# Patient Record
Sex: MALE | Race: WHITE | NOT HISPANIC OR LATINO | Employment: OTHER | URBAN - METROPOLITAN AREA
[De-identification: names, ages, dates, MRNs, and addresses within clinical notes are randomized per-mention and may not be internally consistent; named-entity substitution may affect disease eponyms.]

---

## 2023-11-08 ENCOUNTER — TELEPHONE (OUTPATIENT)
Dept: FAMILY MEDICINE CLINIC | Facility: CLINIC | Age: 68
End: 2023-11-08

## 2023-11-08 NOTE — TELEPHONE ENCOUNTER
Caller: Tony Mike    Relationship: Self    Best call back number: [phone]    What form or medical record are you requesting: PT WILL BE COMING TOMORROW TO DROP FORM OFF THAT HE RECEIVED FROM PREVIOUS PROVIDER SO THAT NEW PCP CAN REQUEST MEDICAL RECORDS

## 2023-11-14 ENCOUNTER — LAB (OUTPATIENT)
Dept: FAMILY MEDICINE CLINIC | Facility: CLINIC | Age: 68
End: 2023-11-14
Payer: MEDICARE

## 2023-11-14 ENCOUNTER — OFFICE VISIT (OUTPATIENT)
Dept: FAMILY MEDICINE CLINIC | Facility: CLINIC | Age: 68
End: 2023-11-14
Payer: MEDICARE

## 2023-11-14 VITALS
HEART RATE: 86 BPM | RESPIRATION RATE: 16 BRPM | TEMPERATURE: 98.5 F | WEIGHT: 155.5 LBS | DIASTOLIC BLOOD PRESSURE: 66 MMHG | SYSTOLIC BLOOD PRESSURE: 110 MMHG | BODY MASS INDEX: 23.57 KG/M2 | HEIGHT: 68 IN | OXYGEN SATURATION: 98 %

## 2023-11-14 DIAGNOSIS — Z00.00 ENCOUNTER FOR MEDICAL EXAMINATION TO ESTABLISH CARE: Primary | ICD-10-CM

## 2023-11-14 DIAGNOSIS — M72.0 DUPUYTREN'S CONTRACTURE OF RIGHT HAND: ICD-10-CM

## 2023-11-14 DIAGNOSIS — M79.641 RIGHT HAND PAIN: ICD-10-CM

## 2023-11-14 DIAGNOSIS — E03.9 ACQUIRED HYPOTHYROIDISM: Primary | ICD-10-CM

## 2023-11-14 PROCEDURE — 84439 ASSAY OF FREE THYROXINE: CPT | Performed by: FAMILY MEDICINE

## 2023-11-14 PROCEDURE — 36415 COLL VENOUS BLD VENIPUNCTURE: CPT | Performed by: FAMILY MEDICINE

## 2023-11-14 PROCEDURE — 1159F MED LIST DOCD IN RCRD: CPT | Performed by: FAMILY MEDICINE

## 2023-11-14 PROCEDURE — 99203 OFFICE O/P NEW LOW 30 MIN: CPT | Performed by: FAMILY MEDICINE

## 2023-11-14 PROCEDURE — 1160F RVW MEDS BY RX/DR IN RCRD: CPT | Performed by: FAMILY MEDICINE

## 2023-11-14 PROCEDURE — 84443 ASSAY THYROID STIM HORMONE: CPT | Performed by: FAMILY MEDICINE

## 2023-11-14 RX ORDER — LEVOTHYROXINE SODIUM 0.1 MG/1
100 TABLET ORAL DAILY
COMMUNITY
End: 2023-11-17 | Stop reason: SDUPTHER

## 2023-11-14 RX ORDER — DIPHENOXYLATE HYDROCHLORIDE AND ATROPINE SULFATE 2.5; .025 MG/1; MG/1
1 TABLET ORAL DAILY
COMMUNITY

## 2023-11-14 NOTE — PROGRESS NOTES
Subjective   Tony Mike is a 68 y.o. male      Chief Complaint  Establish primary care.   Hypothyroidism.       History of Present Illness  The patient presents here today to establish primary care. He moved here from [place]. He is on Synthroid 100 mcg daily for hypothyroidism.    The patient has been experiencing pain in his right upper extremity in his hand for the past 2 years. He was referred to a hand specialist before he left [place]; however, he did not get there. The doctor initially thought it was carpal tunnel; however, when he looked at the things online, the symptoms did not line up. He came back to him and was told that it did not seem like carpal tunnel. He was told that he needed to see a hand specialist. He was initially diagnosed with Dupuytren's contracture; however, it has not progressed. He has been looking online for some possible therapies. It has not particularly bothered him; however, it is just the pains that are around it. He is not restricted motion wise. He has had pains in his hand; however, in the last week he started getting pains and they switched over to the side of his wrist. He has a lot of family arthritis and they had some diagnoses of arthritis in other joints. His wrist is a little restrictive. It is uncomfortable. He denies any tingling.    The patient was diagnosed with arthritis in his bilateral upper extremities 2 to 3 years ago. He went to his PCP and told him how he suddenly developed severe arthritis in his bilateral upper extremities. He had some serious pain. He was prescribed some medications. He was on gabapentin for 2 years. He weaned himself off of it. He does not have the pain; however, he has some discomfort in his shoulder sometimes. He went back to his PCP and they told him that it was not really severe. He has some occasional discomfort, especially when he is sleeping in his bed. He has to turn over to the other side sometimes.    The patient was diagnosed with  sciatica in 2019. He eventually got an injection in his back because the pain was radiating down his lower extremity. He has not had any symptoms of sciatica ever since.    The patient reports that his thyroid has been stable. He has glossal thyroid. The growth of his thyroid did not develop as it normally does in the throat. He has to take levothyroxine to supplement his thyroid. He has been on the same dosage for 20 years. He had a little bit of fluctuation in the dosage back in the late 1990s; however, it has been since the year of 2000. He needs a refill on his levothyroxine. He only has 9 tablets left. He needs a blood panel.    The patient has been doing his yearly physical irregularly. He is going to find an eye doctor and dentist. He just had his influenza vaccine and the latest COVID-19 vaccine 2 to 3 weeks ago. He has a little bit of a cold. He has not been back to get that. He has not had the shingles vaccine.      The following portions of the patient's history were reviewed and updated as appropriate: allergies, current medications, past social history and problem list.    Review of Systems   Constitutional:  Negative for fatigue and unexpected weight change.   Eyes:  Negative for visual disturbance.   Cardiovascular:  Negative for chest pain and palpitations.   Gastrointestinal:  Negative for constipation and diarrhea.   Endocrine: Negative for cold intolerance and heat intolerance.   Musculoskeletal:  Positive for arthralgias and myalgias.   Neurological:  Negative for tremors.   Psychiatric/Behavioral:  Negative for agitation. The patient is not nervous/anxious.          Objective         Vitals:    11/14/23 1303   BP: 110/66   Pulse: 86   Resp: 16   Temp: 98.5 °F (36.9 °C)   SpO2: 98%         Physical Exam  Vitals and nursing note reviewed.   Constitutional:       General: He is not in acute distress.     Appearance: Normal appearance. He is well-developed. He is not ill-appearing, toxic-appearing  or diaphoretic.   HENT:      Head: Normocephalic and atraumatic.   Eyes:      Comments: No exopthalmos noted   Neck:      Thyroid: No thyroid mass, thyromegaly or thyroid tenderness.   Cardiovascular:      Rate and Rhythm: Normal rate and regular rhythm.   Pulmonary:      Effort: Pulmonary effort is normal. No respiratory distress.   Lymphadenopathy:      Cervical: No cervical adenopathy.   Skin:     General: Skin is warm and dry.   Neurological:      Mental Status: He is alert and oriented to person, place, and time.      Coordination: Coordination normal.   Psychiatric:         Mood and Affect: Mood normal.         Behavior: Behavior normal.              No results were obtained or interpreted today.      Assessment & Plan     Problems Addressed this Visit    None  Visit Diagnoses       Acquired hypothyroidism    -  Primary    Relevant Medications    levothyroxine (SYNTHROID, LEVOTHROID) 100 MCG tablet    Other Relevant Orders    TSH    T4, Free    Dupuytren's contracture of right hand        Relevant Orders    Ambulatory Referral to Orthopedic Surgery    Right hand pain        Relevant Orders    Ambulatory Referral to Orthopedic Surgery          Diagnoses         Codes Comments    Acquired hypothyroidism    -  Primary ICD-10-CM: E03.9  ICD-9-CM: 244.9     Dupuytren's contracture of right hand     ICD-10-CM: M72.0  ICD-9-CM: 728.6     Right hand pain     ICD-10-CM: M79.641  ICD-9-CM: 729.5             I spent 35 minutes in patient care: Reviewing records prior to the visit, examining the patient, entering orders and documentation    Part of this note may be an electronic transcription/translation of spoken language to printed text using the Dragon Dictation System.          Transcribed from ambient dictation for NAEL Trujillo MD by Destiny Solorzano.  11/14/23   14:06 EST    Patient or patient representative verbalized consent to the visit recording.  I have personally performed the services described in  this document as transcribed by the above individual, and it is both accurate and complete.

## 2023-11-15 LAB
T4 FREE SERPL-MCNC: 1.28 NG/DL (ref 0.93–1.7)
TSH SERPL DL<=0.05 MIU/L-ACNC: 5.02 UIU/ML (ref 0.27–4.2)

## 2023-11-16 ENCOUNTER — OFFICE VISIT (OUTPATIENT)
Dept: ORTHOPEDIC SURGERY | Facility: CLINIC | Age: 68
End: 2023-11-16
Payer: MEDICARE

## 2023-11-16 ENCOUNTER — PATIENT ROUNDING (BHMG ONLY) (OUTPATIENT)
Dept: FAMILY MEDICINE CLINIC | Facility: CLINIC | Age: 68
End: 2023-11-16
Payer: MEDICARE

## 2023-11-16 VITALS
BODY MASS INDEX: 23.95 KG/M2 | DIASTOLIC BLOOD PRESSURE: 84 MMHG | WEIGHT: 158 LBS | HEIGHT: 68 IN | SYSTOLIC BLOOD PRESSURE: 120 MMHG

## 2023-11-16 DIAGNOSIS — M79.641 RIGHT HAND PAIN: ICD-10-CM

## 2023-11-16 DIAGNOSIS — M72.0 DUPUYTREN'S CONTRACTURE OF HAND: ICD-10-CM

## 2023-11-16 DIAGNOSIS — M19.039 ARTHRITIS OF WRIST: Primary | ICD-10-CM

## 2023-11-16 NOTE — PROGRESS NOTES
[facility]     Office Visit       Date: 11/16/2023   Patient Name: Tony Mike  MRN: [id number]  YOB: 1955    Referring Physician: Angel Trujillo*     Chief Complaint:   Chief Complaint   Patient presents with    Right Hand - Pain     History of Present Illness:   Tony Mike is a 68 y.o. male right-hand-dominant presented clinic as new patient complaints of right radial sided wrist pain as well as Dupuytren's nodule.  Patient reports he noticed the nodule to the palmar aspect of his right hand approximately 2 years ago.  Is not worsening.  This is nonpainful to touch.  Recently he has noted radial sided wrist pain is very mild and intermittent.  He reports some stiffness with wrist extension.  Pain is made worse with movement.  No pain at rest.  No numbness or tingling.  No other complaints or concerns.    Subjective   Review of Systems:   Review of Systems   Constitutional: Negative.  Negative for chills, fatigue and fever.   HENT: Negative.  Negative for congestion and dental problem.    Eyes: Negative.  Negative for blurred vision.   Respiratory: Negative.  Negative for shortness of breath.    Cardiovascular: Negative.  Negative for leg swelling.   Gastrointestinal: Negative.  Negative for abdominal pain.   Endocrine: Negative.  Negative for polyuria.   Genitourinary: Negative.  Negative for difficulty urinating.   Musculoskeletal:  Positive for arthralgias.   Skin: Negative.    Allergic/Immunologic: Negative.    Neurological: Negative.    Hematological: Negative.  Negative for adenopathy.   Psychiatric/Behavioral: Negative.  Negative for behavioral problems.       Pertinent review of systems per HPI    I reviewed the patient's chief complaint, history of present illness, review of systems, past medical history, surgical history, family history, social history, medications and allergy list in the EMR on  "11/16/2023 and agree with the findings above.    Objective    Quality Measures:   ACP:   ACP discussion was declined by the patient.      Tobacco:   Tony Mike  reports that he has never smoked. He has never used smokeless tobacco..     Vital Signs:   Vitals:    11/16/23 1255   BP: 120/84   Weight: 71.7 kg (158 lb)   Height: 171.5 cm (67.5\")     BMI: BMI is within normal parameters. No other follow-up for BMI required.     General: No acute distress. Alert and oriented.     Ortho Exam:  Examination of the right upper extremity demonstrates a small Dupuytren's pit noted in line with the ring finger.  There is no palpable nodule or cord.  No central or spiral cords.  The MCP and PIP joints are without contracture.  Nontender at the anatomic snuffbox, radioscaphoid articulation, and dorsal SL interval. 45 degrees of wrist extension. Able to make a composite fist.  Sensation intact light touch throughout the hand.  Warm well-perfused distally.    Imaging / Studies:    Imaging Results (Last 24 Hours)       Procedure Component Value Units Date/Time    XR Hand 3+ View Right [[id number]] Resulted: 11/16/23 1340     Updated: 11/16/23 1343    Narrative:      Right Hand X-Ray    Indication: Pain    Views:  AP, Lateral, and Oblique     Comparison: None    Findings:  No fractures or dislocation. No bony lesions. Normal soft tissues.  Joint   space narrowing noted at the radius scaphoid articulation with radial   styloid beaking.  There is subtle widening at the SL interval which may   represent a prior injury.  The capitolunate articulation maintains   well-maintained.  Ulnar positivity noted with possible cyst formation   along the ulnar aspect of the lunate.  Notes of prior distal radius   fracture with shortening noted.    Impression:   Stage II SLAC wrist.  Ulnar positive variance.               Assessment / Plan    Assessment/Plan:   Tony Mike is a 68 y.o. male with right wrist arthritis and right palmar " Dupuytren's.    I discussed with the patient their clinical and radiographic findings demonstrate right wrist arthritis and right palmar Dupuytren's pit.  We had a lengthy discussion regarding the pathophysiology of their diagnosis.  Reviewed with the patient their x-rays and the pertinent anatomy.  Regarding his wrist arthritis, conservative and surgical treatments discussed.  Conservative treatments with bracing, anti-inflammatories both oral and topical, therapy, and injection were presented.  Additionally I briefly discussed surgical treatments for wrist arthritis which for him may include an AIN PIN neurectomy, proximal row carpectomy, or scaphoid excision 4 corner fusion.  Reports that his pain is very intermittent and mild when present.  He would like to start with a prescription for Voltaren gel.  I think this is reasonable.  He would like to hold off on any other interventions.    I discussed with the patient their clinical findings demonstrate a Dupuytren's pit affecting right palm.  We had a lengthy discussion regarding the pathophysiology of their diagnosis.  It was explained that Dupuytren's is a genetic disorder that affects the palmar fascia of the hands.  This leads to pit, nodule, and cord formation and can cause contractures of the MCP and PIP joints.  These are not painful but can lead to functional difficulties given the development of contractures.  Options were discussed including continued observation, collagenase injection, needle aponeurotomy, and surgical intervention with open partial palmar/digital fasciectomy.  After expressing understanding of all options, the patient elects to proceed with observation.  This is reasonable given the small pit and no cord formation present.  He will return to clinic as needed.        ICD-10-CM ICD-9-CM   1. Arthritis of wrist  M19.039 716.93   2. Dupuytren's contracture of hand  M72.0 728.6   3. Right hand pain  M79.641 729.5     Follow Up:   Return if  symptoms worsen or fail to improve.      Malou Mae MD  [facility]

## 2023-11-16 NOTE — PROGRESS NOTES
[facility] [place], [place] a My-Chart message has been sent to the patient for PATIENT ROUNDING with a My chart message

## 2023-11-17 ENCOUNTER — TELEPHONE (OUTPATIENT)
Dept: ORTHOPEDIC SURGERY | Facility: CLINIC | Age: 68
End: 2023-11-17
Payer: MEDICARE

## 2023-11-17 ENCOUNTER — PATIENT ROUNDING (BHMG ONLY) (OUTPATIENT)
Dept: ORTHOPEDIC SURGERY | Facility: CLINIC | Age: 68
End: 2023-11-17
Payer: MEDICARE

## 2023-11-17 RX ORDER — LEVOTHYROXINE SODIUM 0.1 MG/1
100 TABLET ORAL DAILY
Qty: 90 TABLET | Refills: 1 | Status: SHIPPED | OUTPATIENT
Start: 2023-11-17

## 2023-11-17 NOTE — PROGRESS NOTES
November 17, 2023    Hello, may I speak with Tony Mike?    My name is Jeanine      I am  with [facility]  [address]  [place] [address].    Before we get started may I verify your date of birth? 1955    I am calling to officially welcome you to our practice and ask about your recent visit. Is this a good time to talk? No.  Voice message left      Thank you, and have a great day.

## 2023-11-17 NOTE — TELEPHONE ENCOUNTER
Caller: Tony Mike    Relationship: Self    Best call back number: [phone] (home)       Caller requesting test results: PATIENT    What test was performed: XRAY RT WRIST    When was the test performed: 11.16.23    Where was the test performed:

## 2023-11-17 NOTE — TELEPHONE ENCOUNTER
Dr. Mae,    Patient called wanting xray results in layman's terms. I was not quite sure how to explain it to him. Please advise thank you!  Kiara YANES (R), ROT

## 2023-11-17 NOTE — TELEPHONE ENCOUNTER
Caller: Tony Mike    Relationship: Self    Best call back number: [phone]     Requested Prescriptions:   Requested Prescriptions     Pending Prescriptions Disp Refills    levothyroxine (SYNTHROID, LEVOTHROID) 100 MCG tablet       Sig: Take 1 tablet by mouth Daily.        Pharmacy where request should be sent: [facility] [id number] [place], [place] - [address] - [phone]  - [phone] FX     Last office visit with prescribing clinician: 11/14/2023   Last telemedicine visit with prescribing clinician: Visit date not found   Next office visit with prescribing clinician: Visit date not found     Does the patient have less than a 3 day supply:  [x] Yes  [] No    Would you like a call back once the refill request has been completed: [] Yes [x] No    If the office needs to give you a call back, can they leave a voicemail: [] Yes [x] No    Abundio Lorenzo Rep   11/17/23 12:11 EST

## 2023-11-20 NOTE — TELEPHONE ENCOUNTER
I called patient and explained to him what Dr. Mae said and he expressed understanding.  Kiara Zee RT (R), ROT

## 2024-06-05 ENCOUNTER — OFFICE VISIT (OUTPATIENT)
Dept: FAMILY MEDICINE CLINIC | Facility: CLINIC | Age: 69
End: 2024-06-05
Payer: MEDICARE

## 2024-06-05 ENCOUNTER — LAB (OUTPATIENT)
Dept: LAB | Facility: HOSPITAL | Age: 69
End: 2024-06-05
Payer: MEDICARE

## 2024-06-05 VITALS
DIASTOLIC BLOOD PRESSURE: 72 MMHG | TEMPERATURE: 96.5 F | OXYGEN SATURATION: 99 % | SYSTOLIC BLOOD PRESSURE: 120 MMHG | WEIGHT: 156 LBS | HEART RATE: 88 BPM | HEIGHT: 68 IN | BODY MASS INDEX: 23.64 KG/M2 | RESPIRATION RATE: 16 BRPM

## 2024-06-05 DIAGNOSIS — E03.9 ACQUIRED HYPOTHYROIDISM: Primary | ICD-10-CM

## 2024-06-05 DIAGNOSIS — H61.23 BILATERAL IMPACTED CERUMEN: ICD-10-CM

## 2024-06-05 DIAGNOSIS — E03.9 ACQUIRED HYPOTHYROIDISM: ICD-10-CM

## 2024-06-05 DIAGNOSIS — H53.413 VISUAL FIELD SCOTOMA OF BOTH EYES: ICD-10-CM

## 2024-06-05 PROCEDURE — 99213 OFFICE O/P EST LOW 20 MIN: CPT | Performed by: FAMILY MEDICINE

## 2024-06-05 PROCEDURE — 36415 COLL VENOUS BLD VENIPUNCTURE: CPT

## 2024-06-05 PROCEDURE — 84443 ASSAY THYROID STIM HORMONE: CPT

## 2024-06-05 PROCEDURE — 84439 ASSAY OF FREE THYROXINE: CPT

## 2024-06-05 NOTE — PROGRESS NOTES
Subjective   Tony Mike is a 68 y.o. male    Chief Complaint    Hypothyroidism  Cerumen impaction  Scotomata    History of Present Illness  Patient presents today for follow-up regarding his hypothyroidism.  He is due for lab check and prescription refills.  He has a long history of recurring cerumen impaction of his ears requiring getting them flushed out once or twice a year.  He would like to get them cleaned if they need it today.    Patient complains of scotomata symptoms both eyes.  Had evaluation by his eye doctor and was told that he was a glaucoma suspect.  They were then referring him on to a glaucoma specialist but she has since left town.  I have advised him to get a second opinion.  He is agreeable to a referral.    The following portions of the patient's history were reviewed and updated as appropriate: allergies, current medications, past social history and problem list    Review of Systems   Constitutional:  Negative for fatigue and unexpected weight change.   Eyes:  Negative for visual disturbance.   Cardiovascular:  Negative for chest pain and palpitations.   Gastrointestinal:  Negative for constipation and diarrhea.   Endocrine: Negative for cold intolerance and heat intolerance.   Musculoskeletal: Negative.    Skin: Negative.    Neurological:  Negative for tremors, weakness, light-headedness and headaches.   Psychiatric/Behavioral:  Positive for sleep disturbance. Negative for agitation, behavioral problems, confusion, decreased concentration, dysphoric mood and hallucinations. The patient is not nervous/anxious and is not hyperactive.        Objective     Vitals:    06/05/24 1446   BP: 120/72   Pulse: 88   Resp: 16   Temp: 96.5 °F (35.8 °C)   SpO2: 99%       Physical Exam  Vitals and nursing note reviewed.   Constitutional:       General: He is not in acute distress.     Appearance: Normal appearance. He is well-developed. He is not ill-appearing, toxic-appearing or diaphoretic.   HENT:       Head: Normocephalic and atraumatic.   Eyes:      Comments: No exopthalmos noted   Neck:      Thyroid: No thyroid mass, thyromegaly or thyroid tenderness.   Cardiovascular:      Rate and Rhythm: Normal rate and regular rhythm.   Pulmonary:      Effort: Pulmonary effort is normal. No respiratory distress.   Lymphadenopathy:      Cervical: No cervical adenopathy.   Skin:     General: Skin is warm and dry.   Neurological:      Mental Status: He is alert and oriented to person, place, and time.      Coordination: Coordination normal.   Psychiatric:         Mood and Affect: Mood normal.         Behavior: Behavior normal.         Assessment & Plan   Problems Addressed this Visit    None  Visit Diagnoses       Acquired hypothyroidism    -  Primary    Relevant Orders    TSH    T4, Free    Bilateral impacted cerumen        Visual field scotoma of both eyes        Relevant Orders    Ambulatory Referral to Optometry          Diagnoses         Codes Comments    Acquired hypothyroidism    -  Primary ICD-10-CM: E03.9  ICD-9-CM: 244.9     Bilateral impacted cerumen     ICD-10-CM: H61.23  ICD-9-CM: 380.4     Visual field scotoma of both eyes     ICD-10-CM: H53.413  ICD-9-CM: 368.44           Plan     I spent 45 minutes in patient care: Reviewing records prior to the visit, examining the patient, entering orders and documentation    Part of this note may be an electronic transcription/translation of spoken language to printed text using the Dragon Dictation System.

## 2024-06-06 LAB
T4 FREE SERPL-MCNC: 1.61 NG/DL (ref 0.92–1.68)
TSH SERPL DL<=0.05 MIU/L-ACNC: 1.5 UIU/ML (ref 0.27–4.2)

## 2024-06-06 RX ORDER — LEVOTHYROXINE SODIUM 0.1 MG/1
100 TABLET ORAL DAILY
Qty: 90 TABLET | Refills: 1 | Status: SHIPPED | OUTPATIENT
Start: 2024-06-06

## 2024-06-06 NOTE — PROGRESS NOTES
Call patient.  Thyroid results much better.  Continue current dose of levothyroxine.  Does he need a refill?

## 2024-06-06 NOTE — TELEPHONE ENCOUNTER
Caller: Tony Mike    Relationship: Self    Best call back number: [phone]     Requested Prescriptions:   Requested Prescriptions     Pending Prescriptions Disp Refills    levothyroxine (SYNTHROID, LEVOTHROID) 100 MCG tablet 90 tablet 1     Sig: Take 1 tablet by mouth Daily.        Pharmacy where request should be sent: [facility] [id number] [place] [id number] - [phone]  - [phone] FX     Last office visit with prescribing clinician: 6/5/2024   Last telemedicine visit with prescribing clinician: Visit date not found   Next office visit with prescribing clinician: 12/11/2024     Additional details provided by patient:     Does the patient have less than a 3 day supply:  [] Yes  [x] No    Would you like a call back once the refill request has been completed: [] Yes [x] No    If the office needs to give you a call back, can they leave a voicemail: [] Yes [x] No    Abundio Zuniga   06/06/24 11:31 EDT

## 2024-06-10 NOTE — TELEPHONE ENCOUNTER
Caller: Tony Mike    Relationship: Self    Best call back number: [phone]    Who are you requesting to speak with (clinical staff, provider,  specific staff member): CLINICAL STAFF    What was the call regarding: DR ALVARADO ONLY SENT IN ONE REFILL ON THIS MEDICATION. PATIENT STATES HE WOULD LIKE TO GO BACK TO RECEIVING THREE REFILLS INSTEAD OF ONE SO THAT WILL LAST HIM UNTIL THE NEXT ANNUAL PHYSICAL. PATIENT WOULD LIKE A CALL BACK

## 2024-06-12 ENCOUNTER — TELEPHONE (OUTPATIENT)
Dept: FAMILY MEDICINE CLINIC | Facility: CLINIC | Age: 69
End: 2024-06-12
Payer: MEDICARE

## 2024-06-12 RX ORDER — LEVOTHYROXINE SODIUM 0.1 MG/1
100 TABLET ORAL DAILY
Qty: 90 TABLET | Refills: 3 | Status: SHIPPED | OUTPATIENT
Start: 2024-06-12

## 2024-06-12 NOTE — TELEPHONE ENCOUNTER
Caller: TONY MIKE     Best call back number:     [phone] (Home)           Who are you requesting to speak with (clinical staff, provider,  specific staff member):   CLINICAL     What was the call regarding:  PATIENT SAID HE REQUESTED 3 REFILLS FOR THE MEDICATION LEVOTHYROXINE SO IT WOULD LAST HIM THE ENTIRE YEAR   HE IS REQUESTING A CALL BACK TO LET HIM KNOW IF THIS CAN BE DONE        Caller: Tony Mike     Relationship: Self     Best call back number: [phone]     Who are you requesting to speak with (clinical staff, provider,  specific staff member): CLINICAL STAFF     What was the call regarding: DR ALVARADO ONLY SENT IN ONE REFILL ON THIS MEDICATION. PATIENT STATES HE WOULD LIKE TO GO BACK TO RECEIVING THREE REFILLS INSTEAD OF ONE SO THAT WILL LAST HIM UNTIL THE NEXT ANNUAL PHYSICAL. PATIENT WOULD LIKE A CALL BACK

## 2024-06-12 NOTE — TELEPHONE ENCOUNTER
Patient has a medicare wellness appointment scheduled 12/11/2024. Do you want to follow up on his thyroid medication at this appointment?

## 2024-12-11 ENCOUNTER — LAB (OUTPATIENT)
Dept: FAMILY MEDICINE CLINIC | Facility: CLINIC | Age: 69
End: 2024-12-11
Payer: MEDICARE

## 2024-12-11 ENCOUNTER — OFFICE VISIT (OUTPATIENT)
Dept: FAMILY MEDICINE CLINIC | Facility: CLINIC | Age: 69
End: 2024-12-11
Payer: MEDICARE

## 2024-12-11 VITALS
TEMPERATURE: 96.9 F | OXYGEN SATURATION: 96 % | BODY MASS INDEX: 24.22 KG/M2 | RESPIRATION RATE: 12 BRPM | WEIGHT: 159.8 LBS | HEART RATE: 85 BPM | SYSTOLIC BLOOD PRESSURE: 118 MMHG | HEIGHT: 68 IN | DIASTOLIC BLOOD PRESSURE: 78 MMHG

## 2024-12-11 DIAGNOSIS — Z12.5 PROSTATE CANCER SCREENING: ICD-10-CM

## 2024-12-11 DIAGNOSIS — Z00.00 MEDICARE ANNUAL WELLNESS VISIT, SUBSEQUENT: Primary | ICD-10-CM

## 2024-12-11 DIAGNOSIS — E03.9 ACQUIRED HYPOTHYROIDISM: ICD-10-CM

## 2024-12-11 DIAGNOSIS — L57.0 AK (ACTINIC KERATOSIS): ICD-10-CM

## 2024-12-11 DIAGNOSIS — Z51.81 MEDICATION MONITORING ENCOUNTER: ICD-10-CM

## 2024-12-11 DIAGNOSIS — D22.39 FIBROUS PAPULE OF NOSE: ICD-10-CM

## 2024-12-11 DIAGNOSIS — L21.9 SEBORRHEA OF FACE: ICD-10-CM

## 2024-12-11 LAB
ALBUMIN SERPL-MCNC: 4.3 G/DL (ref 3.5–5.2)
ALBUMIN/GLOB SERPL: 1.4 G/DL
ALP SERPL-CCNC: 74 U/L (ref 39–117)
ALT SERPL W P-5'-P-CCNC: 18 U/L (ref 1–41)
ANION GAP SERPL CALCULATED.3IONS-SCNC: 11.5 MMOL/L (ref 5–15)
AST SERPL-CCNC: 23 U/L (ref 1–40)
BILIRUB SERPL-MCNC: 0.8 MG/DL (ref 0–1.2)
BUN SERPL-MCNC: 17 MG/DL (ref 8–23)
BUN/CREAT SERPL: 15.7 (ref 7–25)
CALCIUM SPEC-SCNC: 9.4 MG/DL (ref 8.6–10.5)
CHLORIDE SERPL-SCNC: 104 MMOL/L (ref 98–107)
CHOLEST SERPL-MCNC: 168 MG/DL (ref 0–200)
CO2 SERPL-SCNC: 26.5 MMOL/L (ref 22–29)
CREAT SERPL-MCNC: 1.08 MG/DL (ref 0.76–1.27)
DEPRECATED RDW RBC AUTO: 41.5 FL (ref 37–54)
EGFRCR SERPLBLD CKD-EPI 2021: 73.4 ML/MIN/1.73
ERYTHROCYTE [DISTWIDTH] IN BLOOD BY AUTOMATED COUNT: 11.9 % (ref 12.3–15.4)
GLOBULIN UR ELPH-MCNC: 3 GM/DL
GLUCOSE SERPL-MCNC: 122 MG/DL (ref 65–99)
HCT VFR BLD AUTO: 46.5 % (ref 37.5–51)
HDLC SERPL-MCNC: 49 MG/DL (ref 40–60)
HGB BLD-MCNC: 15.6 G/DL (ref 13–17.7)
LDLC SERPL CALC-MCNC: 83 MG/DL (ref 0–100)
LDLC/HDLC SERPL: 1.56 {RATIO}
MCH RBC QN AUTO: 31.9 PG (ref 26.6–33)
MCHC RBC AUTO-ENTMCNC: 33.5 G/DL (ref 31.5–35.7)
MCV RBC AUTO: 95.1 FL (ref 79–97)
PLATELET # BLD AUTO: 261 10*3/MM3 (ref 140–450)
PMV BLD AUTO: 9.9 FL (ref 6–12)
POTASSIUM SERPL-SCNC: 4.2 MMOL/L (ref 3.5–5.2)
PROT SERPL-MCNC: 7.3 G/DL (ref 6–8.5)
PSA SERPL-MCNC: 0.94 NG/ML (ref 0–4)
RBC # BLD AUTO: 4.89 10*6/MM3 (ref 4.14–5.8)
SODIUM SERPL-SCNC: 142 MMOL/L (ref 136–145)
T4 FREE SERPL-MCNC: 1.86 NG/DL (ref 0.92–1.68)
TRIGL SERPL-MCNC: 213 MG/DL (ref 0–150)
TSH SERPL DL<=0.05 MIU/L-ACNC: 0.35 UIU/ML (ref 0.27–4.2)
VLDLC SERPL-MCNC: 36 MG/DL (ref 5–40)
WBC NRBC COR # BLD AUTO: 5.57 10*3/MM3 (ref 3.4–10.8)

## 2024-12-11 PROCEDURE — 36415 COLL VENOUS BLD VENIPUNCTURE: CPT | Performed by: FAMILY MEDICINE

## 2024-12-11 PROCEDURE — G0103 PSA SCREENING: HCPCS | Performed by: FAMILY MEDICINE

## 2024-12-11 PROCEDURE — 80061 LIPID PANEL: CPT | Performed by: FAMILY MEDICINE

## 2024-12-11 PROCEDURE — 85027 COMPLETE CBC AUTOMATED: CPT | Performed by: FAMILY MEDICINE

## 2024-12-11 PROCEDURE — 84443 ASSAY THYROID STIM HORMONE: CPT | Performed by: FAMILY MEDICINE

## 2024-12-11 PROCEDURE — 80053 COMPREHEN METABOLIC PANEL: CPT | Performed by: FAMILY MEDICINE

## 2024-12-11 PROCEDURE — 84439 ASSAY OF FREE THYROXINE: CPT | Performed by: FAMILY MEDICINE

## 2024-12-12 ENCOUNTER — TELEPHONE (OUTPATIENT)
Dept: FAMILY MEDICINE CLINIC | Facility: CLINIC | Age: 69
End: 2024-12-12

## 2024-12-12 NOTE — TELEPHONE ENCOUNTER
Caller: Tony Mike    Relationship: Self    Best call back number:       [phone] (Mobile)     What is the best time to reach you:     ANY TIME THIS AFTERNOON    Who are you requesting to speak with (clinical staff, provider,  specific staff member):     DR ALVARADO OR NURSE    What was the call regarding:     PATIENT STATED HE RECEIVED RECENT BLOOD WORK RESULTS VIA HIS MYCHART    PATIENT REQUESTED A CALL BACK TO GO OVER THE RESULTS IN GREATER DETAIL

## 2024-12-13 NOTE — TELEPHONE ENCOUNTER
I am seeing patients all day and cannot return phone calls to every patient who wants to discuss their results.  I will send him a letter or note that will be in My Chart.

## 2024-12-16 NOTE — PROGRESS NOTES
The ABCs of the Annual Wellness Visit  Subsequent Medicare Wellness Visit    Chief Complaint   Patient presents with    Medicare Wellness-subsequent      Subjective   History of Present Illness:  Tony Mike is a 69 y.o. male who presents for a Subsequent Medicare Wellness Visit.  History of Present Illness      The following portions of the patient's history were reviewed and   updated as appropriate: allergies, current medications, past family history, past medical history, past social history, past surgical history, and problem list.    Compared to one year ago, the patient feels his physical   health is the same.    Compared to one year ago, the patient feels his mental   health is the same.    Recent Hospitalizations:  He was not admitted to the hospital during the last year.       Current Medical Providers:  Patient Care Team:  Angel Trujillo MD as PCP - General (Family Medicine)    Outpatient Medications Prior to Visit   Medication Sig Dispense Refill    Diclofenac Sodium (VOLTAREN) 1 % gel gel Apply 4 g topically to the appropriate area as directed 2 (Two) Times a Day. 100 g 1    levothyroxine (SYNTHROID, LEVOTHROID) 100 MCG tablet Take 1 tablet by mouth Daily. 90 tablet 3    multivitamin (MULTI VITAMIN DAILY PO) Take 1 tablet by mouth Daily.       No facility-administered medications prior to visit.       No opioid medication identified on active medication list. I have reviewed chart for other potential  high risk medication/s and harmful drug interactions in the elderly.        Aspirin is not on active medication list.  Aspirin use is not indicated based on review of current medical condition/s. Risk of harm outweighs potential benefits.  .    Patient Active Problem List   Diagnosis    Arthritis of wrist    Dupuytren's contracture of hand     Advance Care Planning  ACP discussion was declined by the patient.      Review of Systems      Objective      Vitals:    12/11/24 1403   BP: 118/78  "  Pulse: 85   Resp: 12   Temp: 96.9 °F (36.1 °C)   SpO2: 96%   Weight: 72.5 kg (159 lb 12.8 oz)   Height: 172.7 cm (68\")   PainSc: 0-No pain     BMI Readings from Last 1 Encounters:   24 24.30 kg/m²   BMI is within normal parameters. No follow-up required.    Does the patient have evidence of cognitive impairment? No    Physical Exam  Physical Exam      Lab Results   Component Value Date    TRIG 213 (H) 2024    HDL 49 2024    LDL 83 2024    VLDL 36 2024     Results             HEALTH RISK ASSESSMENT    Smoking Status:  Social History     Tobacco Use   Smoking Status Never   Smokeless Tobacco Never     Alcohol Consumption:  Social History     Substance and Sexual Activity   Alcohol Use Never     Fall Risk Screen:    LILIBETH Fall Risk Assessment was completed, and patient is at LOW risk for falls.Assessment completed on:2024    Depression Screenin/11/2024     2:09 PM   PHQ-2/PHQ-9 Depression Screening   Little interest or pleasure in doing things Not at all   Feeling down, depressed, or hopeless Not at all       Health Habits and Functional and Cognitive Screenin/11/2024     2:09 PM   Functional & Cognitive Status   Do you have difficulty preparing food and eating? No   Do you have difficulty bathing yourself, getting dressed or grooming yourself? No   Do you have difficulty using the toilet? No   Do you have difficulty moving around from place to place? No   Do you have trouble with steps or getting out of a bed or a chair? No   Current Diet Well Balanced Diet   Dental Exam Up to date   Eye Exam Up to date   Exercise (times per week) 5 times per week   Current Exercises Include Pickleball   Do you need help using the phone?  No   Are you deaf or do you have serious difficulty hearing?  No   Do you need help to go to places out of walking distance? No   Do you need help shopping? No   Do you need help preparing meals?  No   Do you need help with housework?  No "   Do you need help with laundry? No   Do you need help taking your medications? No   Do you need help managing money? No   Do you ever drive or ride in a car without wearing a seat belt? No   Have you felt unusual stress, anger or loneliness in the last month? No   Who do you live with? Alone   If you need help, do you have trouble finding someone available to you? No   Have you been bothered in the last four weeks by sexual problems? No   Do you have difficulty concentrating, remembering or making decisions? No       Age-appropriate Screening Schedule:  Refer to the list below for future screening recommendations based on patient's age, sex and/or medical conditions. Orders for these recommended tests are listed in the plan section. The patient has been provided with a written plan.    Health Maintenance   Topic Date Due    COLORECTAL CANCER SCREENING  Never done    TDAP/TD VACCINES (1 - Tdap) Never done    HEPATITIS C SCREENING  Never done    ANNUAL WELLNESS VISIT  Never done    COVID-19 Vaccine  Completed    INFLUENZA VACCINE  Completed    Pneumococcal Vaccine 65+  Completed    ZOSTER VACCINE  Completed              Assessment & Plan     CMS Preventative Services Quick Reference  Risk Factors Identified During Encounter  None Identified  The above risks/problems have been discussed with the patient.  Follow up actions/plans if indicated are seen below in the Assessment/Plan Section.  Pertinent information has been shared with the patient in the After Visit Summary.    Diagnoses and all orders for this visit:    1. Medicare annual wellness visit, subsequent (Primary)  -     CBC (No Diff)  -     Comprehensive Metabolic Panel  -     Lipid Panel  -     TSH  -     T4, Free    2. Acquired hypothyroidism  -     Comprehensive Metabolic Panel  -     TSH  -     T4, Free    3. Prostate cancer screening  -     PSA Screen    4. Medication monitoring encounter  -     CBC (No Diff)  -     Comprehensive Metabolic Panel  -      TSH  -     T4, Free    5. Seborrhea of face  -     Ambulatory Referral to Dermatology    6. AK (actinic keratosis)  -     Ambulatory Referral to Dermatology    7. Fibrous papule of nose  -     Ambulatory Referral to Dermatology    Other orders  -     Pneumococcal Conjugate Vaccine 20-Valent (PCV20)      Assessment & Plan      Follow Up:  No follow-ups on file.     An After Visit Summary and PPPS were given to the patient.

## 2024-12-27 ENCOUNTER — TELEPHONE (OUTPATIENT)
Dept: FAMILY MEDICINE CLINIC | Facility: CLINIC | Age: 69
End: 2024-12-27
Payer: MEDICARE

## 2024-12-27 NOTE — TELEPHONE ENCOUNTER
PATIENT CALLED AGAIN UPSET THAT HE HAS HEARD NOTHING FROM THE OFFICE ABOUT HIS LAB RESULTS OTHER THAN A MYCHART MESSAGE THAT HE WOULD GET A LETTER ABOUT HIS RESULTS.HE HAS NOT GOT THAT YET EITHER. HE MADE THE STATEMENT OF SHOULD HE GET ANOTHER PCP.   HE WOULD LIKE TO HEAR FROM A NURSE TODAY ABOUT HIS LAB RESULTS FROM 12/11/24. LET HIM KNOW THAT I WOULD SEND THE MESSAGE AGAIN.

## 2025-01-03 ENCOUNTER — TELEPHONE (OUTPATIENT)
Dept: FAMILY MEDICINE CLINIC | Facility: CLINIC | Age: 70
End: 2025-01-03

## 2025-01-03 NOTE — TELEPHONE ENCOUNTER
Caller: Tony Mike    Relationship: Self    Best call back number: [phone]     What is the best time to reach you: ANY DAY AFTER 1/5/2024, IN THE AFTERNOON.     Who are you requesting to speak with (clinical staff, provider,  specific staff member): DR. ALVARADO    Do you know the name of the person who called: CLINICAL    What was the call regarding: PATIENT HAS QUESTIONS ABOUT RESULTS.    Is it okay if the provider responds through MyChart: NO

## 2025-01-13 DIAGNOSIS — E78.1 HYPERTRIGLYCERIDEMIA: ICD-10-CM

## 2025-01-13 DIAGNOSIS — R73.09 ELEVATED GLUCOSE LEVEL: Primary | ICD-10-CM

## 2025-03-19 ENCOUNTER — OFFICE VISIT (OUTPATIENT)
Dept: FAMILY MEDICINE CLINIC | Facility: CLINIC | Age: 70
End: 2025-03-19
Payer: MEDICARE

## 2025-03-19 ENCOUNTER — LAB (OUTPATIENT)
Dept: FAMILY MEDICINE CLINIC | Facility: CLINIC | Age: 70
End: 2025-03-19
Payer: MEDICARE

## 2025-03-19 VITALS
TEMPERATURE: 98.2 F | HEART RATE: 66 BPM | BODY MASS INDEX: 24.52 KG/M2 | SYSTOLIC BLOOD PRESSURE: 120 MMHG | WEIGHT: 161.8 LBS | RESPIRATION RATE: 16 BRPM | HEIGHT: 68 IN | OXYGEN SATURATION: 100 % | DIASTOLIC BLOOD PRESSURE: 74 MMHG

## 2025-03-19 DIAGNOSIS — E78.1 HYPERTRIGLYCERIDEMIA: ICD-10-CM

## 2025-03-19 DIAGNOSIS — E03.9 ACQUIRED HYPOTHYROIDISM: ICD-10-CM

## 2025-03-19 DIAGNOSIS — R73.09 ELEVATED GLUCOSE LEVEL: Primary | ICD-10-CM

## 2025-03-19 PROCEDURE — 99214 OFFICE O/P EST MOD 30 MIN: CPT | Performed by: FAMILY MEDICINE

## 2025-03-19 PROCEDURE — 80048 BASIC METABOLIC PNL TOTAL CA: CPT | Performed by: FAMILY MEDICINE

## 2025-03-19 PROCEDURE — 84439 ASSAY OF FREE THYROXINE: CPT | Performed by: FAMILY MEDICINE

## 2025-03-19 PROCEDURE — 1126F AMNT PAIN NOTED NONE PRSNT: CPT | Performed by: FAMILY MEDICINE

## 2025-03-19 PROCEDURE — 84443 ASSAY THYROID STIM HORMONE: CPT | Performed by: FAMILY MEDICINE

## 2025-03-19 PROCEDURE — 83036 HEMOGLOBIN GLYCOSYLATED A1C: CPT | Performed by: FAMILY MEDICINE

## 2025-03-19 PROCEDURE — 36415 COLL VENOUS BLD VENIPUNCTURE: CPT | Performed by: FAMILY MEDICINE

## 2025-03-19 PROCEDURE — 80061 LIPID PANEL: CPT | Performed by: FAMILY MEDICINE

## 2025-03-20 LAB
ANION GAP SERPL CALCULATED.3IONS-SCNC: 10.5 MMOL/L (ref 5–15)
BUN SERPL-MCNC: 16 MG/DL (ref 8–23)
BUN/CREAT SERPL: 15 (ref 7–25)
CALCIUM SPEC-SCNC: 9.4 MG/DL (ref 8.6–10.5)
CHLORIDE SERPL-SCNC: 103 MMOL/L (ref 98–107)
CHOLEST SERPL-MCNC: 183 MG/DL (ref 0–200)
CO2 SERPL-SCNC: 24.5 MMOL/L (ref 22–29)
CREAT SERPL-MCNC: 1.07 MG/DL (ref 0.76–1.27)
EGFRCR SERPLBLD CKD-EPI 2021: 74.2 ML/MIN/1.73
GLUCOSE SERPL-MCNC: 91 MG/DL (ref 65–99)
HBA1C MFR BLD: 5.8 % (ref 4.8–5.6)
HDLC SERPL-MCNC: 54 MG/DL (ref 40–60)
LDLC SERPL CALC-MCNC: 114 MG/DL (ref 0–100)
LDLC/HDLC SERPL: 2.09 {RATIO}
POTASSIUM SERPL-SCNC: 4.3 MMOL/L (ref 3.5–5.2)
SODIUM SERPL-SCNC: 138 MMOL/L (ref 136–145)
T4 FREE SERPL-MCNC: 1.76 NG/DL (ref 0.92–1.68)
TRIGL SERPL-MCNC: 82 MG/DL (ref 0–150)
TSH SERPL DL<=0.05 MIU/L-ACNC: 0.94 UIU/ML (ref 0.27–4.2)
VLDLC SERPL-MCNC: 15 MG/DL (ref 5–40)

## 2025-03-20 NOTE — PROGRESS NOTES
Subjective   Tony Mike is a 69 y.o. male    Chief Complaint    Hypothyroidism  Elevated glucose level  Elevated cholesterol levels  Discussed dermatology appointment    Skin Problem  Pertinent negative symptoms include no abdominal pain, no chest pain, no chills, no congestion, no cough, no diaphoresis, no fatigue, no fever, no headaches, no myalgias, no nausea, no neck pain, no numbness, no rash, no sore throat, no dysuria, no vomiting and no weakness.   Hypothyroidism  Patient reports no anxiety, cold intolerance, constipation, diaphoresis, diarrhea, fatigue, heat intolerance, palpitations or tremors.     History of Present Illness  The patient is a 69-year-old male who presents to the office today concerned about skin problems and his dermatologic referral. He also has hypothyroidism and wants to discuss his most recent lab results.    He has been referred to Dr. Steen, a dermatologist. He reports that the dermatologist's notes do not align with his recollection of the visit. He does not recall a scalp examination or prescription for a shampoo, as indicated in the notes. A lesion was excised, which has since resolved. A papule was shaved off and treated with Vaseline for a week to 10 days. He has attempted to contact the dermatologist regarding a potential malignancy but has not received a response. He received one call on his cell phone, which he did not answer, and no message was left. Despite his requests for communication via landline, he has not received any calls.     He eventually received the notes, which he feels are inaccurate, and has decided not to return to the dermatologist. He has been using a ketoconazole shampoo, although he is uncertain of its purpose as his scalp was not examined. He has been applying the shampoo himself for the past few weeks and reports some improvement.    He has been advised to repeat his blood tests due to concerns about his triglyceride and glucose levels. He was not  fasting during his last blood test, which was conducted 3 hours after a substantial lunch. He typically observes a liquid diet every Saturday, consisting of chocolate milk and ice cream. He has expressed interest in understanding the implications of prediabetes and the significance of A1c as a diagnostic tool. He does not believe he has diabetes.    He has been on a consistent dose of thyroid medication for an extended period. He has observed significant fluctuations in his thyroid hormone levels over the past three tests, with high, middle, and low readings.    He has expressed interest in receiving another measles vaccine, having never been vaccinated before. He contracted measles, mumps, and chickenpox in his childhood, prior to the availability of the MMR vaccine. He is also considering a second dose of the most recent COVID-19 vaccine.  He is advised that if he had measles, [place] measles, mumps and chickenpox he will not need revaccination.  His immunity is as good as you can get.    He is advised that any queries regarding the dermatologist notes and/or instructions will have to come from the dermatologist.    We reviewed his previous lab results in detail.      The following portions of the patient's history were reviewed and updated as appropriate: allergies, current medications, past social history and problem list    Review of Systems   Constitutional:  Negative for appetite change, chills, diaphoresis, fatigue, fever and unexpected weight change.   HENT:  Negative for congestion and sore throat.    Eyes:  Negative for visual disturbance.   Respiratory:  Negative for cough, chest tightness, shortness of breath and wheezing.    Cardiovascular:  Negative for chest pain, palpitations and leg swelling.   Gastrointestinal:  Negative for abdominal pain, constipation, diarrhea, nausea and vomiting.   Endocrine: Negative for cold intolerance, heat intolerance, polydipsia, polyphagia and polyuria.   Genitourinary:   Negative for dysuria, frequency and urgency.   Musculoskeletal:  Negative for arthralgias, back pain, myalgias and neck pain.   Skin:  Negative for color change, pallor, rash and wound.   Neurological:  Negative for dizziness, tremors, weakness, light-headedness, numbness and headaches.   Hematological:  Negative for adenopathy. Does not bruise/bleed easily.   Psychiatric/Behavioral:  Negative for agitation. The patient is not nervous/anxious.        Objective     Vitals:    03/19/25 1330   BP: 120/74   Pulse: 66   Resp: 16   Temp: 98.2 °F (36.8 °C)   SpO2: 100%       Physical Exam  Vitals and nursing note reviewed.   Constitutional:       General: He is not in acute distress.     Appearance: Normal appearance. He is well-developed. He is not ill-appearing, toxic-appearing or diaphoretic.   HENT:      Head: Normocephalic and atraumatic.   Eyes:      Conjunctiva/sclera: Conjunctivae normal.      Pupils: Pupils are equal, round, and reactive to light.      Comments: No exopthalmos noted   Neck:      Thyroid: No thyroid mass, thyromegaly or thyroid tenderness.      Vascular: No JVD.   Cardiovascular:      Rate and Rhythm: Normal rate and regular rhythm.   Pulmonary:      Effort: Pulmonary effort is normal. No respiratory distress.   Musculoskeletal:      Cervical back: Neck supple.      Right lower leg: No edema.      Left lower leg: No edema.   Lymphadenopathy:      Cervical: No cervical adenopathy.   Skin:     General: Skin is warm and dry.   Neurological:      Mental Status: He is alert and oriented to person, place, and time.      Sensory: No sensory deficit.      Coordination: Coordination normal.   Psychiatric:         Mood and Affect: Mood normal.         Behavior: Behavior normal.       Physical Exam      Assessment & Plan   Assessment & Plan  1. Hypothyroidism.  His TSH levels have shown a decreasing trend, while his free T4 levels have been increasing. This could be due to variations in the thyroid hormone  content of the tablets. Given his stability on the current dose, no immediate changes will be made. A recheck of his thyroid levels will be conducted today. If the trend continues, a dosage adjustment may be necessary.    2. Skin lesions.  He reported that a lesion was shaved off and treated with Vaseline, which has since resolved. Another lesion was frozen and fell off within two days. He was prescribed a ketoconazole shampoo for seborrheic dermatitis, which he has been using for the past couple of weeks with some improvement. He was advised to continue using the ketoconazole shampoo as needed.    3. Elevated glucose and triglycerides.  His previous blood test showed elevated glucose and triglyceride levels, likely due to not fasting. A recheck of his glucose, triglycerides, cholesterol, and A1c levels will be conducted today to ensure accurate results.    4. Health Maintenance.  He inquired about the need for a measles vaccine, but it was determined that he does not need it since he had measles as a child. The possibility of needing an annual COVID-19 vaccine was discussed, similar to the flu vaccine.    Problems Addressed this Visit    None  Visit Diagnoses         Elevated glucose level    -  Primary    Relevant Orders    Basic Metabolic Panel    Hemoglobin A1c      Hypertriglyceridemia        Relevant Orders    Lipid Panel      Acquired hypothyroidism        Relevant Orders    TSH    T4, Free          Diagnoses         Codes Comments      Elevated glucose level    -  Primary ICD-10-CM: R73.09  ICD-9-CM: 790.29       Hypertriglyceridemia     ICD-10-CM: E78.1  ICD-9-CM: 272.1       Acquired hypothyroidism     ICD-10-CM: E03.9  ICD-9-CM: 244.9           I spent 30 minutes in patient care: Reviewing records prior to the visit, examining the patient, entering orders and documentation    Part of this note may be an electronic transcription/translation of spoken language to printed text using the Dragon Dictation  System.

## 2025-03-25 ENCOUNTER — TELEPHONE (OUTPATIENT)
Dept: FAMILY MEDICINE CLINIC | Facility: CLINIC | Age: 70
End: 2025-03-25

## 2025-03-25 NOTE — TELEPHONE ENCOUNTER
Caller: Tony Mike    Relationship: Self    Best call back number: [phone]     What form or medical record are you requesting: PCP NOTES FROM LAST VISIT, 3.19.25    Who is requesting this form or medical record from you: SELF    How would you like to receive the form or medical records (pick-up, mail, fax): UPLOADED TO [employer]    Timeframe paperwork needed: ASAP

## 2025-04-02 NOTE — TELEPHONE ENCOUNTER
Caller: Tony Mike    Relationship: Self    Best call back number: [phone]     What is the best time to reach you: ANYTIME    Who are you requesting to speak with (clinical staff, provider,  specific staff member): PCP/MA    Do you know the name of the person who called: TONY    What was the call regarding: PATIENT CHECKING ON VISIT NOTES REQUEST FROM 3/25/25. I ADVISED PATIENT HOW TO GO INTO [employer] TO GET THOSE     Is it okay if the provider responds through [employer]: CALLBACK IF NEEDED

## 2025-06-03 ENCOUNTER — OFFICE VISIT (OUTPATIENT)
Dept: FAMILY MEDICINE CLINIC | Facility: CLINIC | Age: 70
End: 2025-06-03
Payer: MEDICARE

## 2025-06-03 VITALS
SYSTOLIC BLOOD PRESSURE: 120 MMHG | WEIGHT: 158 LBS | DIASTOLIC BLOOD PRESSURE: 76 MMHG | BODY MASS INDEX: 23.95 KG/M2 | HEART RATE: 69 BPM | RESPIRATION RATE: 16 BRPM | HEIGHT: 68 IN | TEMPERATURE: 98.6 F | OXYGEN SATURATION: 100 %

## 2025-06-03 DIAGNOSIS — H93.8X2 EAR CONGESTION, LEFT: Primary | ICD-10-CM

## 2025-06-03 PROCEDURE — 1160F RVW MEDS BY RX/DR IN RCRD: CPT | Performed by: FAMILY MEDICINE

## 2025-06-03 PROCEDURE — 1159F MED LIST DOCD IN RCRD: CPT | Performed by: FAMILY MEDICINE

## 2025-06-03 PROCEDURE — 1126F AMNT PAIN NOTED NONE PRSNT: CPT | Performed by: FAMILY MEDICINE

## 2025-06-03 PROCEDURE — 99213 OFFICE O/P EST LOW 20 MIN: CPT | Performed by: FAMILY MEDICINE

## 2025-06-10 NOTE — PROGRESS NOTES
Subjective   Tony Mike is a 69 y.o. male    Chief Complaint    Stopped up ears    Ear Fullness  Associated symptoms: no dizziness, no cough, no tinnitus, no ear discharge, no headaches, no hearing loss, no neck pain, no rash, no rhinorrhea, no sore throat, no adenopathy and no congestion      History of Present Illness  The patient is a 69-year-old male who presents today complaining of his left ear feeling stopped up.    He reports intermittent sensations of fullness and pressure in his left ear, which he attempted to alleviate with Debrox drops on 05/30/2025. However, he believes the drops were not fully effective as they did not completely drain out. He has a history of recurrent ear plugging, typically occurring once annually. He also mentions that he regularly visited his primary care physician for ear cleaning procedures. He has previously used warm water to flush out his ears but has not utilized a bulb syringe for this purpose.          The following portions of the patient's history were reviewed and updated as appropriate: allergies, current medications, past social history and problem list    Review of Systems   HENT:  Positive for ear pain. Negative for congestion, ear discharge, hearing loss, rhinorrhea, sore throat and tinnitus.    Respiratory:  Negative for cough.    Musculoskeletal:  Negative for neck pain.   Skin:  Negative for rash.   Neurological:  Negative for dizziness and headaches.   Hematological:  Negative for adenopathy.       Objective     Vitals:    06/03/25 1455   BP: 120/76   Pulse: 69   Resp: 16   Temp: 98.6 °F (37 °C)   SpO2: 100%       Physical Exam  Vitals and nursing note reviewed.   Constitutional:       Appearance: Normal appearance.   HENT:      Head: Normocephalic and atraumatic.      Right Ear: Tympanic membrane and ear canal normal. There is no impacted cerumen.      Left Ear: Tympanic membrane and ear canal normal. There is no impacted cerumen.      Nose: Nose normal.       Mouth/Throat:      Pharynx: Oropharynx is clear.   Eyes:      Conjunctiva/sclera: Conjunctivae normal.      Pupils: Pupils are equal, round, and reactive to light.   Neurological:      General: No focal deficit present.      Mental Status: He is alert and oriented to person, place, and time.   Psychiatric:         Mood and Affect: Mood normal.         Behavior: Behavior normal.       Physical Exam      Assessment & Plan   Assessment & Plan  1.ear fullness  - Advised to use a bulb syringe with warm water to flush out the earwax.  - If symptoms persist, return for further evaluation.    Problems Addressed this Visit    None  Visit Diagnoses         Ear congestion, left    -  Primary          Diagnoses         Codes Comments      Ear congestion, left    -  Primary ICD-10-CM: H93.8X2  ICD-9-CM: 388.8           I spent 15 minutes in patient care: Reviewing records prior to the visit, examining the patient, entering orders and documentation    Part of this note may be an electronic transcription/translation of spoken language to printed text using the Dragon Dictation System.

## 2025-08-27 RX ORDER — LEVOTHYROXINE SODIUM 100 UG/1
100 TABLET ORAL DAILY
Qty: 90 TABLET | Refills: 3 | Status: SHIPPED | OUTPATIENT
Start: 2025-08-27